# Patient Record
Sex: FEMALE | Race: WHITE | NOT HISPANIC OR LATINO | Employment: UNEMPLOYED | URBAN - METROPOLITAN AREA
[De-identification: names, ages, dates, MRNs, and addresses within clinical notes are randomized per-mention and may not be internally consistent; named-entity substitution may affect disease eponyms.]

---
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
## 2024-07-03 ENCOUNTER — OFFICE VISIT (OUTPATIENT)
Dept: OBSTETRICS AND GYNECOLOGY | Facility: CLINIC | Age: 19
End: 2024-07-03
Payer: COMMERCIAL

## 2024-07-03 VITALS
DIASTOLIC BLOOD PRESSURE: 65 MMHG | HEART RATE: 65 BPM | SYSTOLIC BLOOD PRESSURE: 106 MMHG | HEIGHT: 68 IN | WEIGHT: 109 LBS | BODY MASS INDEX: 16.52 KG/M2

## 2024-07-03 DIAGNOSIS — Z01.419 WELL WOMAN EXAM WITH ROUTINE GYNECOLOGICAL EXAM: Primary | ICD-10-CM

## 2024-07-03 DIAGNOSIS — Z30.431 IUD CHECK UP: ICD-10-CM

## 2024-07-03 NOTE — PROGRESS NOTES
Chief Complaint   Patient presents with    Women's annual routine gynecological examination        Emily Urias is a 19 y.o.  who presents for an annual examination   [facility]- had hemorrhagic cyst. This resolved and she has not had pain since. In Feb she was seen near her school for kidney infection and had US that showed IUD in lower uterine segment?.  Periods started back last . They are regular and normal in flow.     Pap history:  Prior abnormal paps: no  STDs  Sexually active: yes  History of STDs: no  Has had HPV vaccine: yes  Contraception:  IUD- 2021- Mirena    Screening for BRCA-   Is patient's family history significant for BRCA risk factors? Mom's mom had ovarian cancer and the genetic testing was negative    Past Medical History:   Diagnosis Date    Anxiety      Past Surgical History:   Procedure Laterality Date    DENTAL PROCEDURE      ENDOSCOPY      GALLBLADDER SURGERY       OB History    Para Term  AB Living   0 0 0 0 0 0   SAB IAB Ectopic Molar Multiple Live Births   0 0 0 0 0 0      Social History     Tobacco Use    Smoking status: Never    Smokeless tobacco: Never   Substance Use Topics    Alcohol use: Never    Drug use: Never     Family History   Problem Relation Age of Onset    Hashimoto's thyroiditis Mother     No Known Problems Father     Ovarian cancer Maternal Grandmother     Breast cancer Neg Hx     Uterine cancer Neg Hx     Colon cancer Neg Hx     Deep vein thrombosis Neg Hx     Pulmonary embolism Neg Hx      Current Outpatient Medications on File Prior to Visit   Medication Sig Dispense Refill    busPIRone (BUSPAR) 15 MG tablet 15 mg in the morning, Pt takes 7.5 mg in the afternoon instead of 15 mg.      dexmethylphenidate XR (FOCALIN XR) 10 MG 24 hr capsule Take 1 capsule by mouth Daily      hydrOXYzine (ATARAX) 25 MG tablet       levonorgestrel (Mirena, 52 MG,) 20 MCG/24HR IUD 1 each by Intrauterine route 1 (One) Time.       No  "current facility-administered medications on file prior to visit.     No Known Allergies     Review of Systems  See HPI      OBJECTIVE:   Vitals:    07/03/24 1304   BP: 106/65   Pulse: 65   Weight: 49.4 kg (109 lb)   Height: 172.7 cm (67.99\")      Physical Exam  Exam conducted with a chaperone present.   Constitutional:       Appearance: She is well-developed.   HENT:      Head: Normocephalic and atraumatic.   Eyes:      General: No scleral icterus.  Cardiovascular:      Rate and Rhythm: Normal rate.   Pulmonary:      Effort: Pulmonary effort is normal. No respiratory distress.   Abdominal:      General: There is no distension.      Palpations: Abdomen is soft.      Tenderness: There is no abdominal tenderness. There is no guarding.   Genitourinary:     Labia:         Right: No rash, tenderness or lesion.         Left: No rash, tenderness or lesion.       Vagina: No vaginal discharge, bleeding or lesions.      Cervix: No cervical motion tenderness or friability.      Uterus: Normal. Not enlarged and not tender.       Adnexa:         Right: No mass or tenderness.          Left: No mass or tenderness.        Comments: IUD strings seen  Musculoskeletal:      Cervical back: Normal range of motion.   Skin:     General: Skin is warm and dry.   Neurological:      Mental Status: She is alert and oriented to person, place, and time.   Psychiatric:         Behavior: Behavior normal.         ASSESSMENT/PLAN:   (Z01.419) Well woman exam with routine gynecological exam    (Z30.431) IUD check up - Plan: US Non-ob Transvaginal      Annual well woman exam:  Cervical cancer screening:    Denies cervical dysplasia in past   The patient is due for a pap at 19yo.    Screening guidelines discussed with patient  Breast cancer screening:    Clinical breast exam recommended for age 20-39 years every 1-3 years   Mammogram recommended starting age 40    Breast self awareness encouraged  STD Screening   Testing desires swab.    Contraception " :   Mirena IUD placed in 20. Last US showed ? Of IUD in lower uterine segment. Will get US for evaluation  Family history    does not demonstrate need for genetics referral   Healthy lifestyle counseling:   return for routine annual checkups        Return for GYN US.

## 2024-07-05 LAB
A VAGINAE DNA VAG QL NAA+PROBE: NORMAL SCORE
BVAB2 DNA VAG QL NAA+PROBE: NORMAL SCORE
C ALBICANS DNA VAG QL NAA+PROBE: NEGATIVE
C GLABRATA DNA VAG QL NAA+PROBE: NEGATIVE
MEGA1 DNA VAG QL NAA+PROBE: NORMAL SCORE

## 2025-07-29 ENCOUNTER — OFFICE (OUTPATIENT)
Dept: URBAN - NONMETROPOLITAN AREA CLINIC 5 | Facility: CLINIC | Age: 20
End: 2025-07-29
Payer: COMMERCIAL

## 2025-07-29 VITALS
DIASTOLIC BLOOD PRESSURE: 8 MMHG | RESPIRATION RATE: 16 BRPM | SYSTOLIC BLOOD PRESSURE: 118 MMHG | HEART RATE: 82 BPM | HEIGHT: 69 IN | WEIGHT: 110 LBS

## 2025-07-29 DIAGNOSIS — R19.7 DIARRHEA, UNSPECIFIED: ICD-10-CM

## 2025-07-29 PROCEDURE — 99203 OFFICE O/P NEW LOW 30 MIN: CPT | Performed by: INTERNAL MEDICINE

## 2025-07-29 RX ORDER — COLESEVELAM HYDROCHLORIDE 625 MG/1
1250 TABLET, FILM COATED ORAL
Qty: 60 | Refills: 5 | Status: ACTIVE
Start: 2025-07-29